# Patient Record
Sex: FEMALE | Race: BLACK OR AFRICAN AMERICAN | Employment: UNEMPLOYED | URBAN - METROPOLITAN AREA
[De-identification: names, ages, dates, MRNs, and addresses within clinical notes are randomized per-mention and may not be internally consistent; named-entity substitution may affect disease eponyms.]

---
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
## 2018-04-11 ENCOUNTER — APPOINTMENT (OUTPATIENT)
Dept: ULTRASOUND IMAGING | Facility: HOSPITAL | Age: 18
End: 2018-04-11
Attending: OBSTETRICS & GYNECOLOGY
Payer: COMMERCIAL

## 2018-04-11 ENCOUNTER — HOSPITAL ENCOUNTER (OUTPATIENT)
Facility: HOSPITAL | Age: 18
Setting detail: OBSERVATION
Discharge: HOME | End: 2018-04-12
Attending: OBSTETRICS & GYNECOLOGY | Admitting: OBSTETRICS & GYNECOLOGY
Payer: COMMERCIAL

## 2018-04-11 PROBLEM — Z34.90 PREGNANCY: Status: ACTIVE | Noted: 2018-04-11

## 2018-04-11 PROBLEM — Z34.90 PREGNANCY (HCC): Status: ACTIVE | Noted: 2018-04-11

## 2018-04-11 PROCEDURE — 76811 OB US DETAILED SNGL FETUS: CPT | Performed by: OBSTETRICS & GYNECOLOGY

## 2018-04-11 PROCEDURE — 86701 HIV-1ANTIBODY: CPT | Performed by: OBSTETRICS & GYNECOLOGY

## 2018-04-11 PROCEDURE — 82731 ASSAY OF FETAL FIBRONECTIN: CPT | Performed by: OBSTETRICS & GYNECOLOGY

## 2018-04-11 PROCEDURE — 80053 COMPREHEN METABOLIC PANEL: CPT | Performed by: OBSTETRICS & GYNECOLOGY

## 2018-04-11 PROCEDURE — 87086 URINE CULTURE/COLONY COUNT: CPT | Performed by: OBSTETRICS & GYNECOLOGY

## 2018-04-11 PROCEDURE — 81003 URINALYSIS AUTO W/O SCOPE: CPT | Performed by: OBSTETRICS & GYNECOLOGY

## 2018-04-11 PROCEDURE — 85025 COMPLETE CBC W/AUTO DIFF WBC: CPT | Performed by: OBSTETRICS & GYNECOLOGY

## 2018-04-11 PROCEDURE — 81002 URINALYSIS NONAUTO W/O SCOPE: CPT

## 2018-04-12 VITALS
HEIGHT: 62 IN | RESPIRATION RATE: 16 BRPM | HEART RATE: 80 BPM | DIASTOLIC BLOOD PRESSURE: 56 MMHG | BODY MASS INDEX: 23 KG/M2 | TEMPERATURE: 99 F | SYSTOLIC BLOOD PRESSURE: 108 MMHG | WEIGHT: 125 LBS

## 2018-04-12 PROCEDURE — 76820 UMBILICAL ARTERY ECHO: CPT | Performed by: OBSTETRICS & GYNECOLOGY

## 2018-04-12 PROCEDURE — 76811 OB US DETAILED SNGL FETUS: CPT | Performed by: OBSTETRICS & GYNECOLOGY

## 2018-04-12 PROCEDURE — 96361 HYDRATE IV INFUSION ADD-ON: CPT

## 2018-04-12 PROCEDURE — 96360 HYDRATION IV INFUSION INIT: CPT

## 2018-04-12 PROCEDURE — 96372 THER/PROPH/DIAG INJ SC/IM: CPT

## 2018-04-12 PROCEDURE — 76817 TRANSVAGINAL US OBSTETRIC: CPT | Performed by: OBSTETRICS & GYNECOLOGY

## 2018-04-12 RX ORDER — BETAMETHASONE SODIUM PHOSPHATE AND BETAMETHASONE ACETATE 3; 3 MG/ML; MG/ML
12 INJECTION, SUSPENSION INTRA-ARTICULAR; INTRALESIONAL; INTRAMUSCULAR; SOFT TISSUE EVERY 24 HOURS
Status: DISCONTINUED | OUTPATIENT
Start: 2018-04-12 | End: 2018-04-12

## 2018-04-12 RX ORDER — SODIUM CHLORIDE, SODIUM LACTATE, POTASSIUM CHLORIDE, CALCIUM CHLORIDE 600; 310; 30; 20 MG/100ML; MG/100ML; MG/100ML; MG/100ML
INJECTION, SOLUTION INTRAVENOUS CONTINUOUS
Status: DISCONTINUED | OUTPATIENT
Start: 2018-04-12 | End: 2018-04-13

## 2018-04-12 RX ORDER — NIFEDIPINE 10 MG/1
10 CAPSULE ORAL EVERY 6 HOURS SCHEDULED
Status: DISCONTINUED | OUTPATIENT
Start: 2018-04-12 | End: 2018-04-12

## 2018-04-12 RX ORDER — BETAMETHASONE SODIUM PHOSPHATE AND BETAMETHASONE ACETATE 3; 3 MG/ML; MG/ML
12 INJECTION, SUSPENSION INTRA-ARTICULAR; INTRALESIONAL; INTRAMUSCULAR; SOFT TISSUE ONCE
Status: COMPLETED | OUTPATIENT
Start: 2018-04-12 | End: 2018-04-12

## 2018-04-12 NOTE — IMAGING NOTE
Indication: PTL.   ____________________________________________________________________________  History: Age: 19 years.  : 1 Para: 0.  ____________________________________________________________________________  Dating:  LMP: 09/15/2017 EDC: / appropriate.    ____________________________________________________________________________  Doppler:  Fetal Doppler:  Umbilical Artery: PS 25.5 cm/s    ED 15.81 cm/s    S/D ratio 2.77     RI 0.64     PI 0.96     TAMX 28.92 cm/s   Impression: normal place

## 2018-04-12 NOTE — PROGRESS NOTES
Dr Meza Pac notified of pt status; pt is a 19y/o ; edc 18; 29.5;pt recv's care at [facility]; pt present with c/o constant lower back pain and constant lower abdominal cramping with interernittent increased intensity x's 3 days; pt denies vaginal leaking

## 2018-04-12 NOTE — PROGRESS NOTES
Pt is a 20 y/o ; edc 18; 29.5 weeks gest; pt presents with c/o constatnat lower back pain and constant lower abdominal cramping that intensifies inttermitently x's 3 days; pt denies vaginal leaking or bleeding;reports increase in clear vaginal dis

## 2018-04-12 NOTE — H&P
[facility]    PATIENT'S NAME: Ciro Barahona   ATTENDING PHYSICIAN: Carlos Jenkins M.D.    PATIENT ACCOUNT#:   [de-identified]    LOCATION:  [facility]  MEDICAL RECORD #:   [id number]       YOB: 2000  ADMISSION DATE: gestational age. ASSESSMENT:    1. Intrauterine pregnancy at 29-5/7 weeks with cervical length of 2.0.  2.   Positive fetal fibronectin. PLAN:  The patient is admitted to Labor and Delivery. Patient discussed with Dr. Ari Alvarez.   We will begin steroid

## 2018-04-12 NOTE — PLAN OF CARE
Problem: Patient/Family Goals  Goal: Patient/Family Long Term Goal  Patient's Long Term Goal: Maintain pregnancy until full term    Interventions:  -   - See additional Care Plan goals for specific interventions   Outcome: Progressing    Goal: Patient/Fami

## 2018-04-12 NOTE — CONSULTS
[facility]  Maternal-Fetal Medicine Inpatient Consultation    Date of Admission:  2018  Date of Consult:  2018    Reason for Consult:    labor    History of Present Illness:  Jared Moon is a a(n) 19year old female orientated x3. Cooperative. No apparent distress.   Vital Signs:  /64 (BP Location: Right arm)   Pulse 87   Temp 98.3 °F (36.8 °C)   Resp 16   Ht 5' 2\" (1.575 m)   Wt 125 lb (56.7 kg)   LMP 09/15/2017 (Exact Date)   BMI 22.86 kg/m²   HEENT: Exam is clinical criteria of regular uterine contractions accompanied by a change in cervical dilation, effacement, or both or initial presentation with regular contractions and cervical dilation of at least 2 cm.  Less than 10% of women with the clinical diagnosis fetal neuroprotection. In general, tocolytics are not indicated for use before  viability. Regardless of interventions,  morbidity and mortality at that time are too high to justify the maternal risks associated with tocolytic therapy.  Sim prophylactic tocolytic therapy, home uterine activity monitoring, cerclage, or narcotics to prevent  delivery in women with contractions but no cervical change.  Therefore, women with  contractions without cervical change, especially those wit of  steroids with dosage intervals shorter than those outlined previously, often referred to as accelerated dosing, even when delivery appears imminent.     Magnesium Sulfate For Neuroprotection  Several large clinical studies have evaluated the ev should be used with caution in combination with magnesium sulfate for this indication. Before 32 weeks of gestation, indomethacin is a potential option for use in conjunction with magnesium sulfate.     Maintenance therapy with tocolytics is ineffective for addition, prophylactic tocolytics have not been shown to reduce the risk of  birth or improve  outcomes in women with multiple gestations.   Adequate data do not exist to specifically demonstrate benefit from the use of  corticostero Maryellen Booker MD  4/12/2018  8:07 AM

## 2018-04-13 NOTE — PROGRESS NOTES
2140-DISCHARGE HOME IN STABLE CONDITION. DISCHARGE INSTRUCTIONS GONE OVER WITH PATIENT,VERBALIZES UNDERSTANDING OF DISCHARGE INSTRUCTIONS. 2ND DOSE BETAMETHASONE GIVEN. NO COMPLAINTS AT THIS TIME.  STRIP REVIEWED BY DR. Heriberto Alarcon

## 2018-06-16 ENCOUNTER — APPOINTMENT (OUTPATIENT)
Dept: ULTRASOUND IMAGING | Facility: HOSPITAL | Age: 18
End: 2018-06-16
Attending: OBSTETRICS & GYNECOLOGY
Payer: MEDICAID

## 2018-06-16 ENCOUNTER — HOSPITAL ENCOUNTER (INPATIENT)
Facility: HOSPITAL | Age: 18
LOS: 3 days | Discharge: HOME | End: 2018-06-19
Attending: OBSTETRICS & GYNECOLOGY | Admitting: OBSTETRICS & GYNECOLOGY
Payer: MEDICAID

## 2018-06-16 PROCEDURE — 85027 COMPLETE CBC AUTOMATED: CPT | Performed by: OBSTETRICS & GYNECOLOGY

## 2018-06-16 PROCEDURE — 86900 BLOOD TYPING SEROLOGIC ABO: CPT | Performed by: OBSTETRICS & GYNECOLOGY

## 2018-06-16 PROCEDURE — 86901 BLOOD TYPING SEROLOGIC RH(D): CPT | Performed by: OBSTETRICS & GYNECOLOGY

## 2018-06-16 PROCEDURE — 3E0P7VZ INTRODUCTION OF HORMONE INTO FEMALE REPRODUCTIVE, VIA NATURAL OR ARTIFICIAL OPENING: ICD-10-PCS | Performed by: OBSTETRICS & GYNECOLOGY

## 2018-06-16 PROCEDURE — 76805 OB US >/= 14 WKS SNGL FETUS: CPT | Performed by: OBSTETRICS & GYNECOLOGY

## 2018-06-16 PROCEDURE — 86780 TREPONEMA PALLIDUM: CPT | Performed by: OBSTETRICS & GYNECOLOGY

## 2018-06-16 PROCEDURE — 86850 RBC ANTIBODY SCREEN: CPT | Performed by: OBSTETRICS & GYNECOLOGY

## 2018-06-16 RX ORDER — EPHEDRINE SULFATE/0.9% NACL/PF 25 MG/5 ML
5 SYRINGE (ML) INTRAVENOUS AS NEEDED
Status: DISCONTINUED | OUTPATIENT
Start: 2018-06-16 | End: 2018-06-19

## 2018-06-16 RX ORDER — IBUPROFEN 600 MG/1
600 TABLET ORAL ONCE AS NEEDED
Status: DISCONTINUED | OUTPATIENT
Start: 2018-06-16 | End: 2018-06-17 | Stop reason: HOSPADM

## 2018-06-16 RX ORDER — SODIUM CHLORIDE, SODIUM LACTATE, POTASSIUM CHLORIDE, CALCIUM CHLORIDE 600; 310; 30; 20 MG/100ML; MG/100ML; MG/100ML; MG/100ML
INJECTION, SOLUTION INTRAVENOUS CONTINUOUS
Status: DISCONTINUED | OUTPATIENT
Start: 2018-06-16 | End: 2018-06-17 | Stop reason: HOSPADM

## 2018-06-16 RX ORDER — DEXTROSE, SODIUM CHLORIDE, SODIUM LACTATE, POTASSIUM CHLORIDE, AND CALCIUM CHLORIDE 5; .6; .31; .03; .02 G/100ML; G/100ML; G/100ML; G/100ML; G/100ML
INJECTION, SOLUTION INTRAVENOUS AS NEEDED
Status: DISCONTINUED | OUTPATIENT
Start: 2018-06-16 | End: 2018-06-17 | Stop reason: HOSPADM

## 2018-06-16 RX ORDER — NALBUPHINE HCL 10 MG/ML
2.5 AMPUL (ML) INJECTION
Status: DISCONTINUED | OUTPATIENT
Start: 2018-06-16 | End: 2018-06-19

## 2018-06-16 RX ORDER — TRISODIUM CITRATE DIHYDRATE AND CITRIC ACID MONOHYDRATE 500; 334 MG/5ML; MG/5ML
30 SOLUTION ORAL AS NEEDED
Status: DISCONTINUED | OUTPATIENT
Start: 2018-06-16 | End: 2018-06-17 | Stop reason: HOSPADM

## 2018-06-16 RX ORDER — TERBUTALINE SULFATE 1 MG/ML
0.25 INJECTION, SOLUTION SUBCUTANEOUS AS NEEDED
Status: DISCONTINUED | OUTPATIENT
Start: 2018-06-16 | End: 2018-06-17 | Stop reason: HOSPADM

## 2018-06-16 RX ORDER — ZOLPIDEM TARTRATE 5 MG/1
5 TABLET ORAL NIGHTLY PRN
Status: DISCONTINUED | OUTPATIENT
Start: 2018-06-16 | End: 2018-06-17 | Stop reason: HOSPADM

## 2018-06-16 NOTE — PROGRESS NOTES
Pt is   39.1 GA, Pt is here with complaints of vaginal bleeding @ home around 0400 am after going up to the bathroom. No active vaginal bleeding in triage. Pt say she goes for her prenatal visit in [facility].   EFM applied  Speculum exam don

## 2018-06-16 NOTE — PROGRESS NOTES
Received pt to room 113, report from [address] [place] [address] Rd of care discussed with pt, labs drawn and iv started, discussed cervidil insertion, pt verbalize understanding

## 2018-06-16 NOTE — H&P
[facility]    PATIENT'S NAME: Morgan Brown   ATTENDING PHYSICIAN: Ynes Parsons M.D.    PATIENT ACCOUNT#:   [de-identified]    LOCATION:  [address] [place] [address]  MEDICAL RECORD #:   [id number]       YOB: 2000  ADMISSION DATE:       0 The patient is 5 feet 2 inches. She weighs 146 pounds. ABDOMEN:  Term uterus. Estimated fetal weight approximately 6-1/2 to 7 pounds. Cephalic. Positive fetal heart tones. PELVIC:  Cervical exam by RN on admission was 70%, 1 cm, -2 station.   Leida Eng

## 2018-06-17 PROCEDURE — 0HQ9XZZ REPAIR PERINEUM SKIN, EXTERNAL APPROACH: ICD-10-PCS | Performed by: OBSTETRICS & GYNECOLOGY

## 2018-06-17 PROCEDURE — 10D17Z9 MANUAL EXTRACTION OF PRODUCTS OF CONCEPTION, RETAINED, VIA NATURAL OR ARTIFICIAL OPENING: ICD-10-PCS | Performed by: OBSTETRICS & GYNECOLOGY

## 2018-06-17 RX ORDER — ACETAMINOPHEN 325 MG/1
650 TABLET ORAL EVERY 6 HOURS PRN
Status: DISCONTINUED | OUTPATIENT
Start: 2018-06-17 | End: 2018-06-19

## 2018-06-17 RX ORDER — BISACODYL 10 MG
10 SUPPOSITORY, RECTAL RECTAL ONCE AS NEEDED
Status: ACTIVE | OUTPATIENT
Start: 2018-06-17 | End: 2018-06-17

## 2018-06-17 RX ORDER — CEFAZOLIN SODIUM/WATER 2 G/20 ML
2 SYRINGE (ML) INTRAVENOUS EVERY 8 HOURS
Status: COMPLETED | OUTPATIENT
Start: 2018-06-17 | End: 2018-06-17

## 2018-06-17 RX ORDER — ONDANSETRON 2 MG/ML
4 INJECTION INTRAMUSCULAR; INTRAVENOUS EVERY 6 HOURS PRN
Status: DISCONTINUED | OUTPATIENT
Start: 2018-06-17 | End: 2018-06-19

## 2018-06-17 RX ORDER — IBUPROFEN 600 MG/1
600 TABLET ORAL EVERY 6 HOURS
Status: DISCONTINUED | OUTPATIENT
Start: 2018-06-17 | End: 2018-06-19

## 2018-06-17 RX ORDER — METHYLERGONOVINE MALEATE 0.2 MG/ML
0.2 INJECTION INTRAVENOUS ONCE
Status: DISCONTINUED | OUTPATIENT
Start: 2018-06-17 | End: 2018-06-19

## 2018-06-17 RX ORDER — METHYLERGONOVINE MALEATE 0.2 MG/ML
0.2 INJECTION INTRAVENOUS ONCE
Status: COMPLETED | OUTPATIENT
Start: 2018-06-17 | End: 2018-06-17

## 2018-06-17 RX ORDER — METHYLERGONOVINE MALEATE 0.2 MG/ML
INJECTION INTRAVENOUS
Status: DISCONTINUED
Start: 2018-06-17 | End: 2018-06-17

## 2018-06-17 RX ORDER — DOCUSATE SODIUM 100 MG/1
100 CAPSULE, LIQUID FILLED ORAL
Status: DISCONTINUED | OUTPATIENT
Start: 2018-06-17 | End: 2018-06-19

## 2018-06-17 RX ORDER — SIMETHICONE 80 MG
80 TABLET,CHEWABLE ORAL 3 TIMES DAILY PRN
Status: DISCONTINUED | OUTPATIENT
Start: 2018-06-17 | End: 2018-06-19

## 2018-06-17 NOTE — PLAN OF CARE
Problem: SAFETY ADULT - FALL  Goal: Free from fall injury  INTERVENTIONS:  - Assess pt frequently for physical needs  - Identify cognitive and physical deficits and behaviors that affect risk of falls.   - [place] fall precautions as indicated by assessme

## 2018-06-17 NOTE — L&D DELIVERY NOTE
Brett Fay  [[id number]]    Labor Events     labor?:  Yes   steroids?:  Full Course  Cervical ripening date/time:  2018 1100  Cervical ripening type:  Cervidil  Antibiotics received during labor?:  Yes  Antibiotics (enter # doses in com cm Chest circum.:  32 cm      Abdominal circum.:  29.5 cm       Placenta    Date/time:  2018 1147  Removal:  Manual Removal  Appearance:  Intact  Disposition:  Pathology     Apgars    Living status:  Living   Apgar Scoring Key:     0 1 2    Skin color

## 2018-06-17 NOTE — L&D DELIVERY NOTE
[address]    PATIENT'S NAME: Lianna Kahn   ATTENDING PHYSICIAN: Shakira Davila M.D.    PATIENT ACCOUNT #: [de-identified] LOCATION:  [address] [place] [address]   MEDICAL RECORD #: [id number] YOB: 2000   ADMISSION DATE: 06/16/2018 DELIVER began pushing and then was prepped for delivery. The head delivered at 11:40 a.m., and the patient was then noted to have a shoulder dystocia. The head of the bed was decreased and Aris maneuver was performed.   Suprapubic pressure was also performed Ancef.    PLAN:  Routine postpartum care. Mom and infant, Juan Pablo Rousseau, presently doing well. Shoulder dystocia discussed with the patient. Recommendations of considering induction and possible  with subsequent pregnancy discussed.   All asked question

## 2018-06-17 NOTE — PROGRESS NOTES
NURSING ADMISSION NOTE      Patient admitted via wheelchair. Oriented to room. Safety precautions initiated. Bed in low position. Call light in reach. Both mom/ infant ID bands verified. Hugs and kisses on. [place] safety reviewed.  Teaching initia

## 2018-06-18 PROCEDURE — 85025 COMPLETE CBC W/AUTO DIFF WBC: CPT | Performed by: OBSTETRICS & GYNECOLOGY

## 2018-06-18 NOTE — CM/SW NOTE
met with pt to review insurance and PCP for infant. Pt stated that she will need infant added to medicaid and that she does not have a PCP for infant. [facility] called and they will follow up with pt and do medicaid add on for infant.  SONAM brunner

## 2018-06-18 NOTE — PROGRESS NOTES
PPD1    S; doing well,bleeding is light, breastfeeding, pain ok  O: /63 (BP Location: Right arm)   Pulse 61   Temp 99 °F (37.2 °C) (Oral)   Resp 16   Ht 5' 2\" (1.575 m)   Wt 146 lb (66.2 kg)   LMP 09/15/2017 (Exact Date)   Breastfeeding?  Yes   BMI 2

## 2018-06-19 NOTE — DISCHARGE SUMMARY
[facility]  Discharge Summary    Yfn Doshi Patient Status:  Inpatient    2/15/2000 MRN [id number]   [facility] 2SW-J Attending Radha Palacios MD   Hosp Day # 3 PCP No primary care provider on file.      Date of Admission:

## 2018-06-19 NOTE — PROGRESS NOTES
NURSING DISCHARGE NOTE    Discharged Home via Wheelchair. Accompanied by Spouse  Belongings Taken by patient/family Verbalized good understanding of all D/C instructions. Boo Ferrari

## 2018-06-19 NOTE — CM/SW NOTE
06/19/18 1100   Referral Data   Referral Source Physician   Referral Reason Psychosocial assessment; Discharge planning     SW received orders to make arrangements for pt's baby boy to receive EI services. ELIO met with parents lKever Bearden and Heike Officer.      Dea Dunn

## 2018-06-19 NOTE — PROGRESS NOTES
[facility]  Post-Partum Vaginal Delivery Progress Note    Sandra Seals Patient Status:  Inpatient    2/15/2000 MRN [id number]   [facility] 2SW-J Attending Beverley Alcantara MD   Hosp Day # 3 PCP No primary care provider on michael

## 2018-06-22 ENCOUNTER — TELEPHONE (OUTPATIENT)
Dept: OBGYN UNIT | Facility: HOSPITAL | Age: 18
End: 2018-06-22

## 2018-06-25 ENCOUNTER — TELEPHONE (OUTPATIENT)
Dept: OBGYN UNIT | Facility: HOSPITAL | Age: 18
End: 2018-06-25

## 2018-06-30 VITALS
BODY MASS INDEX: 26.87 KG/M2 | WEIGHT: 146 LBS | HEART RATE: 74 BPM | HEIGHT: 62 IN | RESPIRATION RATE: 18 BRPM | SYSTOLIC BLOOD PRESSURE: 118 MMHG | TEMPERATURE: 99 F | DIASTOLIC BLOOD PRESSURE: 74 MMHG

## 2024-12-09 NOTE — PROGRESS NOTES
S; doing well, no cramping or back pain noted, no bleeding  O: /64 (BP Location: Right arm)   Pulse 87   Temp 98.3 °F (36.8 °C)   Resp 16   Ht 5' 2\" (1.575 m)   Wt 125 lb (56.7 kg)   LMP 09/15/2017 (Exact Date)   BMI 22.86 kg/m²     Recent Labs   [place]-Care Instructions: I reviewed with the patient in detail post-care instructions. Patient is to wear sunprotection, and avoid picking at any of the treated lesions. Pt may apply Vaseline to crusted or scabbing areas. [place]-Care Instructions In Note?: no Detail Level: Detailed Show Aperture Variable?: Yes Consent: The patient's consent was obtained including but not limited to risks of crusting, scabbing, blistering, scarring, darker or lighter pigmentary change, recurrence, incomplete removal and infection. Duration Of Freeze Thaw-Cycle (Seconds): 0

## 2025-07-02 ENCOUNTER — APPOINTMENT (OUTPATIENT)
Dept: URBAN - METROPOLITAN AREA CLINIC 249 | Age: 25
Setting detail: DERMATOLOGY
End: 2025-07-05

## 2025-07-02 DIAGNOSIS — R21 RASH AND OTHER NONSPECIFIC SKIN ERUPTION: ICD-10-CM

## 2025-07-02 PROCEDURE — OTHER PRESCRIPTION MEDICATION MANAGEMENT: OTHER

## 2025-07-02 PROCEDURE — OTHER ADDITIONAL NOTES: OTHER

## 2025-07-02 PROCEDURE — OTHER MEDICATION COUNSELING: OTHER

## 2025-07-02 PROCEDURE — OTHER MIPS QUALITY: OTHER

## 2025-07-02 PROCEDURE — OTHER COUNSELING: OTHER

## 2025-07-02 PROCEDURE — OTHER PRESCRIPTION: OTHER

## 2025-07-02 RX ORDER — CLOBETASOL PROPIONATE CREAM USP, 0.05% 0.5 MG/G
CREAM TOPICAL
Qty: 60 | Refills: 2 | Status: ERX | COMMUNITY
Start: 2025-07-02

## 2025-07-02 ASSESSMENT — LOCATION SIMPLE DESCRIPTION DERM
LOCATION SIMPLE: CHEST
LOCATION SIMPLE: LEFT PRETIBIAL REGION
LOCATION SIMPLE: RIGHT PRETIBIAL REGION
LOCATION SIMPLE: LEFT HAND
LOCATION SIMPLE: LEFT FOREARM
LOCATION SIMPLE: RIGHT HAND
LOCATION SIMPLE: RIGHT FOREARM

## 2025-07-02 ASSESSMENT — LOCATION DETAILED DESCRIPTION DERM
LOCATION DETAILED: LEFT PROXIMAL DORSAL FOREARM
LOCATION DETAILED: RIGHT PROXIMAL DORSAL FOREARM
LOCATION DETAILED: LEFT MEDIAL SUPERIOR CHEST
LOCATION DETAILED: LEFT ULNAR DORSAL HAND
LOCATION DETAILED: RIGHT ULNAR DORSAL HAND
LOCATION DETAILED: LEFT PROXIMAL PRETIBIAL REGION
LOCATION DETAILED: RIGHT PROXIMAL PRETIBIAL REGION

## 2025-07-02 ASSESSMENT — BSA RASH: BSA RASH: 6

## 2025-07-02 ASSESSMENT — LOCATION ZONE DERM
LOCATION ZONE: LEG
LOCATION ZONE: HAND
LOCATION ZONE: TRUNK
LOCATION ZONE: ARM

## 2025-07-02 ASSESSMENT — SEVERITY ASSESSMENT: SEVERITY: MODERATE

## 2025-08-26 ENCOUNTER — HOSPITAL ENCOUNTER (EMERGENCY)
Facility: HOSPITAL | Age: 25
Discharge: LEFT BEFORE DISCHARGE | End: 2025-08-26

## 2025-08-26 ENCOUNTER — APPOINTMENT (OUTPATIENT)
Dept: GENERAL RADIOLOGY | Facility: HOSPITAL | Age: 25
End: 2025-08-26

## 2025-08-26 VITALS
OXYGEN SATURATION: 100 % | HEIGHT: 62 IN | SYSTOLIC BLOOD PRESSURE: 106 MMHG | DIASTOLIC BLOOD PRESSURE: 71 MMHG | HEART RATE: 67 BPM | WEIGHT: 149 LBS | BODY MASS INDEX: 27.42 KG/M2 | RESPIRATION RATE: 16 BRPM

## 2025-08-26 LAB
ALBUMIN SERPL-MCNC: 4.5 G/DL (ref 3.2–4.8)
ALBUMIN/GLOB SERPL: 1.8 (ref 1–2)
ALP LIVER SERPL-CCNC: 62 U/L (ref 37–98)
ALT SERPL-CCNC: 9 U/L (ref 10–49)
ANION GAP SERPL CALC-SCNC: 9 MMOL/L (ref 0–18)
AST SERPL-CCNC: 20 U/L (ref ?–34)
ATRIAL RATE: 58 BPM
BASOPHILS # BLD AUTO: 0.02 X10(3) UL (ref 0–0.2)
BASOPHILS NFR BLD AUTO: 0.6 %
BILIRUB SERPL-MCNC: 0.4 MG/DL (ref 0.3–1.2)
BUN BLD-MCNC: 8 MG/DL (ref 9–23)
CALCIUM BLD-MCNC: 9.8 MG/DL (ref 8.7–10.6)
CHLORIDE SERPL-SCNC: 106 MMOL/L (ref 98–112)
CO2 SERPL-SCNC: 25 MMOL/L (ref 21–32)
CREAT BLD-MCNC: 0.94 MG/DL (ref 0.55–1.02)
EGFRCR SERPLBLD CKD-EPI 2021: 85 ML/MIN/1.73M2 (ref 60–?)
EOSINOPHIL # BLD AUTO: 0.06 X10(3) UL (ref 0–0.7)
EOSINOPHIL NFR BLD AUTO: 1.7 %
ERYTHROCYTE [DISTWIDTH] IN BLOOD BY AUTOMATED COUNT: 12 %
GLOBULIN PLAS-MCNC: 2.5 G/DL (ref 2–3.5)
GLUCOSE BLD-MCNC: 87 MG/DL (ref 70–99)
HCT VFR BLD AUTO: 37.9 % (ref 35–48)
HGB BLD-MCNC: 12.6 G/DL (ref 12–16)
IMM GRANULOCYTES # BLD AUTO: 0.01 X10(3) UL (ref 0–1)
IMM GRANULOCYTES NFR BLD: 0.3 %
LYMPHOCYTES # BLD AUTO: 2 X10(3) UL (ref 1–4)
LYMPHOCYTES NFR BLD AUTO: 55.9 %
MCH RBC QN AUTO: 29.7 PG (ref 26–34)
MCHC RBC AUTO-ENTMCNC: 33.2 G/DL (ref 31–37)
MCV RBC AUTO: 89.4 FL (ref 80–100)
MONOCYTES # BLD AUTO: 0.31 X10(3) UL (ref 0.1–1)
MONOCYTES NFR BLD AUTO: 8.7 %
NEUTROPHILS # BLD AUTO: 1.18 X10 (3) UL (ref 1.5–7.7)
NEUTROPHILS # BLD AUTO: 1.18 X10(3) UL (ref 1.5–7.7)
NEUTROPHILS NFR BLD AUTO: 32.8 %
OSMOLALITY SERPL CALC.SUM OF ELEC: 288 MOSM/KG (ref 275–295)
P AXIS: 51 DEGREES
P-R INTERVAL: 134 MS
PLATELET # BLD AUTO: 196 10(3)UL (ref 150–450)
POTASSIUM SERPL-SCNC: 4.1 MMOL/L (ref 3.5–5.1)
PROT SERPL-MCNC: 7 G/DL (ref 5.7–8.2)
Q-T INTERVAL: 410 MS
QRS DURATION: 80 MS
QTC CALCULATION (BEZET): 402 MS
R AXIS: -31 DEGREES
RBC # BLD AUTO: 4.24 X10(6)UL (ref 3.8–5.3)
SODIUM SERPL-SCNC: 140 MMOL/L (ref 136–145)
T AXIS: 25 DEGREES
TROPONIN I SERPL HS-MCNC: 13 NG/L (ref ?–34)
VENTRICULAR RATE: 58 BPM
WBC # BLD AUTO: 3.6 X10(3) UL (ref 4–11)

## 2025-08-26 PROCEDURE — 99281 EMR DPT VST MAYX REQ PHY/QHP: CPT

## 2025-08-26 PROCEDURE — 84484 ASSAY OF TROPONIN QUANT: CPT

## 2025-08-26 PROCEDURE — 80053 COMPREHEN METABOLIC PANEL: CPT

## 2025-08-26 PROCEDURE — 85025 COMPLETE CBC W/AUTO DIFF WBC: CPT

## 2025-08-26 PROCEDURE — 93005 ELECTROCARDIOGRAM TRACING: CPT

## 2025-08-26 PROCEDURE — 36415 COLL VENOUS BLD VENIPUNCTURE: CPT

## 2025-08-26 PROCEDURE — 71046 X-RAY EXAM CHEST 2 VIEWS: CPT

## (undated) NOTE — ED AVS SNAPSHOT
Chelly Estes   MRN: [id number]    Department:  [facility] Emergency Department   Date of Visit:  12/1/2017           Disclosure     Insurance plans vary and the physician(s) referred by the ER may not be covered by your plan.  Please contact your in tell this physician (or your personal doctor if your instructions are to return to your personal doctor) about any new or lasting problems. The primary care or specialist physician will see patients referred from the [facility] Emergency Department.  Wing Avila